# Patient Record
Sex: MALE | Race: WHITE | NOT HISPANIC OR LATINO | URBAN - METROPOLITAN AREA
[De-identification: names, ages, dates, MRNs, and addresses within clinical notes are randomized per-mention and may not be internally consistent; named-entity substitution may affect disease eponyms.]

---

## 2019-03-23 ENCOUNTER — OFFICE VISIT (OUTPATIENT)
Dept: URGENT CARE | Facility: CLINIC | Age: 29
End: 2019-03-23
Payer: OTHER MISCELLANEOUS

## 2019-03-23 ENCOUNTER — APPOINTMENT (OUTPATIENT)
Dept: RADIOLOGY | Facility: IMAGING CENTER | Age: 29
End: 2019-03-23
Attending: NURSE PRACTITIONER
Payer: OTHER MISCELLANEOUS

## 2019-03-23 VITALS
HEART RATE: 91 BPM | TEMPERATURE: 98.1 F | OXYGEN SATURATION: 99 % | RESPIRATION RATE: 14 BRPM | BODY MASS INDEX: 20.44 KG/M2 | DIASTOLIC BLOOD PRESSURE: 70 MMHG | SYSTOLIC BLOOD PRESSURE: 120 MMHG | WEIGHT: 164.4 LBS | HEIGHT: 75 IN

## 2019-03-23 DIAGNOSIS — S86.912A KNEE STRAIN, LEFT, INITIAL ENCOUNTER: ICD-10-CM

## 2019-03-23 DIAGNOSIS — S89.92XA LEFT KNEE INJURY, INITIAL ENCOUNTER: ICD-10-CM

## 2019-03-23 PROCEDURE — 99202 OFFICE O/P NEW SF 15 MIN: CPT | Performed by: NURSE PRACTITIONER

## 2019-03-23 PROCEDURE — 73562 X-RAY EXAM OF KNEE 3: CPT | Mod: TC,LT | Performed by: NURSE PRACTITIONER

## 2019-03-23 ASSESSMENT — ENCOUNTER SYMPTOMS
FEVER: 0
CHILLS: 0
SENSORY CHANGE: 1
TINGLING: 1

## 2019-03-23 NOTE — PROGRESS NOTES
"Subjective:      Harrison Buchanan is a 28 y.o. male who presents with Knee Injury (skiing yesterday)            HPI New problem. 28 year old male with left knee injury after skiing yesterday. He hit a bump and twisted his knee. Since that time he states mild pain but feels unstable. Some numbness along lateral aspect. Has some ankle pain and swelling as well but more concerned about his knee. He has iced and taken aspirin for this. From out of town. No previous history of knee issues.  Patient has no known allergies.  No current outpatient prescriptions on file prior to visit.     No current facility-administered medications on file prior to visit.      Social History     Social History   • Marital status:      Spouse name: N/A   • Number of children: N/A   • Years of education: N/A     Occupational History   • Not on file.     Social History Main Topics   • Smoking status: Not on file   • Smokeless tobacco: Not on file   • Alcohol use Not on file   • Drug use: Unknown   • Sexual activity: Not on file     Other Topics Concern   • Not on file     Social History Narrative   • No narrative on file     family history is not on file.      Review of Systems   Constitutional: Negative for chills and fever.   Musculoskeletal: Positive for joint pain.   Neurological: Positive for tingling and sensory change.          Objective:     /70 (BP Location: Left arm, Patient Position: Sitting, BP Cuff Size: Adult)   Pulse 91   Temp 36.7 °C (98.1 °F)   Resp 14   Ht 1.905 m (6' 3\")   Wt 74.6 kg (164 lb 6.4 oz)   SpO2 99%   BMI 20.55 kg/m²      Physical Exam   Constitutional: He is oriented to person, place, and time. He appears well-developed and well-nourished. No distress.   Cardiovascular: Normal rate, regular rhythm and normal heart sounds.    No murmur heard.  Pulmonary/Chest: Effort normal and breath sounds normal. No respiratory distress.   Musculoskeletal: Normal range of motion.        Left knee: He " exhibits normal range of motion, no swelling and no effusion. Tenderness found. Medial joint line and lateral joint line tenderness noted.   Neurological: He is alert and oriented to person, place, and time.   Skin: Skin is warm and dry.   Psychiatric: He has a normal mood and affect. His behavior is normal. Thought content normal.               Assessment/Plan:     1. Knee strain, left, initial encounter     2. Left knee injury, initial encounter  DX-KNEE 3 VIEWS LEFT     X-ray with no fracture or dislocation.   Small joint effusion->radiology read.  RICE protocol.   Differential diagnosis, natural history, supportive care, and indications for immediate follow-up discussed at length.